# Patient Record
Sex: MALE | Employment: OTHER | URBAN - METROPOLITAN AREA
[De-identification: names, ages, dates, MRNs, and addresses within clinical notes are randomized per-mention and may not be internally consistent; named-entity substitution may affect disease eponyms.]

---

## 2023-11-16 ENCOUNTER — PROCEDURE VISIT (OUTPATIENT)
Dept: NEUROLOGY | Age: 78
End: 2023-11-16

## 2023-11-16 VITALS — HEART RATE: 64 BPM | OXYGEN SATURATION: 98 % | WEIGHT: 212 LBS | BODY MASS INDEX: 28.71 KG/M2 | HEIGHT: 72 IN

## 2023-11-16 DIAGNOSIS — M79.2 NEUROPATHIC PAIN: ICD-10-CM

## 2023-11-16 DIAGNOSIS — R29.898 WEAKNESS OF BOTH HANDS: ICD-10-CM

## 2023-11-16 DIAGNOSIS — R20.0 NUMBNESS AND TINGLING IN BOTH HANDS: Primary | ICD-10-CM

## 2023-11-16 DIAGNOSIS — G62.9 POLYNEUROPATHY: ICD-10-CM

## 2023-11-16 DIAGNOSIS — R20.2 NUMBNESS AND TINGLING IN BOTH HANDS: Primary | ICD-10-CM

## 2023-11-16 DIAGNOSIS — R20.8 DYSESTHESIA: ICD-10-CM

## 2023-11-16 RX ORDER — GABAPENTIN 300 MG/1
300 CAPSULE ORAL 2 TIMES DAILY
Qty: 60 CAPSULE | Refills: 5 | Status: SHIPPED | OUTPATIENT
Start: 2023-11-16 | End: 2024-05-14

## 2023-11-16 ASSESSMENT — VISUAL ACUITY: OU: 1

## 2023-11-16 NOTE — PROGRESS NOTES
EMG/Nerve Conduction Study Procedure Note  [address] [place] [address], [address]   [phone]      Hx:    Exam:     77 y.o.    M W  male hx DM highest A1c 8.0.. on meds. .. paresthesia-dysesthesia hands re: numbness and +/- weakness . . ? Atrophy . Ramírez Cadet All 4 digits dec pp. Ref[de-identified]    Dr Jose Cantuer  Tech:::  Rich Oh[de-identified]   6'        Summary               needle EMG upper extremities bilateral hands as below with conduction velocity testing. Controlled environmental factors / EMG lab. Temperature. NCV : sensory segments:    Markedly abnormal = bilateral median distal SCV absent/no response SNAP. The left ulnar distal is also absent/no SNAP. Right ulnar markedly attenuated SNAP with slowed SCV. Right ulnar with markedly attenuated SNAP and slowed SCVV. The bilateral radial sensory velocities and segments are normal.  NCV transcarpal sensory segments:     Markedly abnormal = = transcarpal left median transcarpal ulnar = severe, ABSENT/NO RESPONSE SNAP. Right median SNAP absent transcarpal segment with normal right ulnar transcarpal SNAP. NCV Motor MCV segments:     Abnormal = = bilateral median terminal latencies are as follows[de-identified] Right median 6.98 ms terminal latency (UL = 4.15 ms) is severely attenuated CMAP amplitudes. Left median TL 5.60 ms with significantly decreased/attenuated CMAP amplitudes. Ulnar nerve MCV is significantly abnormal worse at the left ulnar across the elbow markedly slowed but actually with conduction blocking. Right ulnar across the elbow is slowed significantly but mildly without significant conduction blocking. F-wave studies:           Significantly abnormal = severe at the left median followed by bilateral ulnar delay prolonged F waves. The right median F wave interestingly this is normal.   NEEDLE EMG:   Tested muscles[de-identified]    Bilateral FCU FDI APB ADM == markedly abnormal :::  Right FDI and Left ADM both 1+ IA +Giant  and DA/RIP+.     R ADM amd

## 2023-11-16 NOTE — PROGRESS NOTES
11/16/2023  Marium Shaver     Patient is referred by the following provider for consultation regarding as below:    [place] [id number] [facility]. Dear      Sueellen Gottron, MD                                    NEUROLOGY     CONSULTATION                   Chief Complaint:  Neuropathic pain  Paresthesia hands  Weakness of hands   Feet paresthesia. 78 yo MWM DM. . A1c up to 8.0   Dr Arnold Love EMG hands. ...                        right handed 77 y.o.   M   male w DM and sensory changes and some motor evelyn left ulnar. * I reviewed the available and pertinent records - including eHR and Care Everywhere - notes of PMHx, PSHx, Fam Hx, and  and have examined patient with the following findings: hx DM . P[aresthesias. Neuropathic pain also,. IMAGING REVIEW:  I REVIEWED PERTINENT  IMAGES AND REPORTS WITH THE PATIENT PERSONALLY, DIRECTLY AND FULLY. 9 extra  MINUTES. Past Medical History:  History reviewed. No pertinent past medical history. See Hx from outside at [facility]. Diabetes. Type II. Hypertension  Other cardiac history  High cholesterol    Past Surgical History:  History reviewed. No pertinent surgical history. Re:   [facility]. = = Colonoscopy, bilateral cataract, hernia, joint replacement bilateral knees, arthroscopy, rotator cuff, vasectomy.     Social History:  Social History     Socioeconomic History    Marital status: Unknown     Spouse name: Not on file    Number of children: Not on file    Years of education: Not on file    Highest education level: Not on file   Occupational History    Not on file   Tobacco Use    Smoking status: Not on file    Smokeless tobacco: Not on file   Substance and Sexual Activity    Alcohol use: Not on file    Drug use: Not on file    Sexual activity: Not on file   Other Topics Concern    Not on file   Social History Narrative    Not on file     Social Determinants of Health     Financial Resource Strain: Not on file   Food

## 2024-02-21 ENCOUNTER — PROCEDURE VISIT (OUTPATIENT)
Dept: NEUROLOGY | Age: 79
End: 2024-02-21
Payer: MEDICARE

## 2024-02-21 VITALS — BODY MASS INDEX: 28.75 KG/M2 | HEIGHT: 72 IN | HEART RATE: 57 BPM | OXYGEN SATURATION: 98 %

## 2024-02-21 DIAGNOSIS — R20.2 PARESTHESIA OF BOTH LOWER EXTREMITIES: Primary | ICD-10-CM

## 2024-02-21 DIAGNOSIS — G62.9 POLYNEUROPATHY: ICD-10-CM

## 2024-02-21 PROCEDURE — 95885 MUSC TST DONE W/NERV TST LIM: CPT | Performed by: PSYCHIATRY & NEUROLOGY

## 2024-02-21 PROCEDURE — 95910 NRV CNDJ TEST 7-8 STUDIES: CPT | Performed by: PSYCHIATRY & NEUROLOGY

## 2024-02-21 NOTE — PROGRESS NOTES
EMG/Nerve Conduction Study Procedure Note  [address]    [address]  [place]  [address]   [phone]      Hx:    Exam:     78 y.o.  M W male  EMG lowers  ..  Paresthesia weakness lowers gait bilateral feet.  Diabetic A1c 6.8.  Previous CTS.  Referring: See PCP Re: Ken Zaidi MD  Technologist: Jeovany Ledesma  Height: 6 foot 0 inch        Summary         needle EMG select lower extremity muscles as noted below with conduction velocity testing.             Controlled environmental factors / EMG lab.  Temperature.   NCV : sensory segments:    Markedly abnormal = bilateral sural nerve SCV = ABSENT /no response of the SNAP bilaterally.  NCV plantar sensory segments:    Deferred.  NCV Motor MCV segments:     Abnormal = right peroneal prolonged TL with attenuated CMAP and slowing proximally.  Left peroneal slowed proximal motor segments equivocal TL.  Tibial MCV equivocal proximal conduction block partially.  F-wave studies:         Significant abnormalities include prolonged right peroneal left tibial and likely prolonged right tibial left peroneal F waves.  H-REFLEX Studies:    Markedly abnormal = ABSENT no response of bilateral   NEEDLE EMG:   Tested muscles::    Bilateral TA MG VL muscles = the only abnormality is in the right gastrocnemius which is equivocal +1+1+1 IA fib PSW but without any other changes of MUP etc. and no recruitment abnormality.  Muscles all within normal limits.      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  MILD.  APPEARS TO BE LENGTH-DEPENDENT.  NO EVIDENCE FOR MYOPATHY MYOTONIA OR FASCICULATIONS.            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      Procedure Details:       Correlates with polyneuropathy = length-dependent sensorimotor axonal and demyelinating type which is nonspecific although it is

## 2024-11-13 ENCOUNTER — TELEPHONE (OUTPATIENT)
Dept: NEUROLOGY | Age: 79
End: 2024-11-13

## 2024-11-13 DIAGNOSIS — M79.2 NEUROPATHIC PAIN: Primary | ICD-10-CM

## 2024-11-13 RX ORDER — GABAPENTIN 300 MG/1
300 CAPSULE ORAL 2 TIMES DAILY
Qty: 180 CAPSULE | Refills: 3 | Status: SHIPPED | OUTPATIENT
Start: 2024-11-13 | End: 2025-11-13

## 2024-11-13 NOTE — TELEPHONE ENCOUNTER
Pt called requesting a refill on his Gabapentin and he was only seen by [facility] for an EMG and the rx has  in , so pt was told to go see his PCP for a refill.